# Patient Record
Sex: FEMALE | URBAN - METROPOLITAN AREA
[De-identification: names, ages, dates, MRNs, and addresses within clinical notes are randomized per-mention and may not be internally consistent; named-entity substitution may affect disease eponyms.]

---

## 2022-11-08 ENCOUNTER — OFFICE VISIT (OUTPATIENT)
Dept: URBAN - METROPOLITAN AREA CLINIC 71 | Facility: CLINIC | Age: 72
End: 2022-11-08
Payer: MEDICARE

## 2022-11-08 DIAGNOSIS — H25.13 AGE-RELATED NUCLEAR CATARACT, BILATERAL: Primary | ICD-10-CM

## 2022-11-08 PROCEDURE — 99203 OFFICE O/P NEW LOW 30 MIN: CPT | Performed by: OPHTHALMOLOGY

## 2022-11-08 ASSESSMENT — INTRAOCULAR PRESSURE
OS: 15
OD: 14

## 2022-11-08 NOTE — IMPRESSION/PLAN
Impression: Age-related nuclear cataract, bilateral: H25.13. Discussed diagnosis with the patient. Patient's vision is mainly effected about 7-10 feet away from her. Plan: No recommendation for surgical treatment at this time. Patient will likely benefit from an updated glasses rx. Will refer to an optometrist. Leonardo Neal discussing that she wants the best correction for 7-10 feet and may need to consider a trifocal. Patient does not tolerate progressive lenses. Return annually for dilated exams. Call if any worsening in vision occurs.

## 2022-12-28 ENCOUNTER — OFFICE VISIT (OUTPATIENT)
Dept: URBAN - METROPOLITAN AREA CLINIC 71 | Facility: CLINIC | Age: 72
End: 2022-12-28
Payer: COMMERCIAL

## 2022-12-28 DIAGNOSIS — H52.4 PRESBYOPIA: Primary | ICD-10-CM

## 2022-12-28 PROCEDURE — 92014 COMPRE OPH EXAM EST PT 1/>: CPT | Performed by: OPTOMETRIST

## 2022-12-28 ASSESSMENT — INTRAOCULAR PRESSURE
OD: 8
OS: 8

## 2022-12-28 ASSESSMENT — VISUAL ACUITY
OS: 20/20
OD: 20/20

## 2022-12-28 NOTE — IMPRESSION/PLAN
Impression: Presbyopia: H52.4. Plan: Presbyopia is the inability to focus on objects (ie: accommodate) due to the loss of flexibility of your natural lens. Presbyopia occurs with age. Reading glasses, bifocals, trifocals or contacts can be helpful. Contact the office if difficulty focusing persists despite corrective eye wear. 
New glasses Rx given today